# Patient Record
Sex: MALE | Race: BLACK OR AFRICAN AMERICAN | URBAN - METROPOLITAN AREA
[De-identification: names, ages, dates, MRNs, and addresses within clinical notes are randomized per-mention and may not be internally consistent; named-entity substitution may affect disease eponyms.]

---

## 2017-05-17 ENCOUNTER — HISTORICAL (OUTPATIENT)
Dept: ADMINISTRATIVE | Facility: HOSPITAL | Age: 27
End: 2017-05-17

## 2022-04-29 VITALS — HEIGHT: 72 IN | WEIGHT: 184.06 LBS | BODY MASS INDEX: 24.93 KG/M2

## 2022-05-03 NOTE — HISTORICAL [facility] CERNER
This is a historical note converted from Que. Formatting and pictures may have been removed.  Please reference [facility] for original formatting and attached multimedia. Chief Complaint  Referral for R 4th MC fx  History of Present Illness  26yM, RHD, ,?sustained a R 4th MC fracture on 5/2/17.? He presented to the ED on the DOI and was placed in an ulnar gutter splint. He presents today for his first ortho visit.  Review of Systems  Constitutional_no fever, no chills  Eye_no trauma  Respiratory_no increased work of breathing  Cardiovascular_regular rate and rhythm  Gastrointestinal_no vomiting  Genitourinary_no burning with urination  Hema/Lymph_no hemorrhage  Musculoskeletal_see note  Integumentary_no rash over extremity  Neurologic_grossly intact, see PE section  Physical Exam  Vitals & Measurements  HT:?182.88?cm? HT:?182.88?cm? WT:?83.5?kg? WT:?83.5?kg? BMI:?24.97?  Gen:? NAD, A+Ox3  Cardio:? RRR  Pulm:? No increased WOB  ?   RUE:  Skin intact  Swelling and TTP about 4th and 5th MC bases  No deformity  Motor: intact ain, pin, ulnar  SILT: m/u/r  2+ RP, bcr, wwp  No crossover  Assessment/Plan  26yM with R 4th and 5th MC fractures  - NWB LUE for 4 more weeks or until fracture healing  - ulnar gutter cast placed, pt instructed to get the cast removed in 3 weeks  - after cast removal, pt will be placed in a removable brace for 1 week for support.?  - after cast removal, begin ROM exercises.  - pt instructed to find an orthopedist in [place] for hand fracture follow up and XRs  - return appointment scheduled for mid-July as patient will not return until then.  ?   Problem List/Past Medical History  Tobacco user  Tobacco user  Historical  No historical problems  Medications  No active medications  Allergies  No Known Allergies  Social History  Alcohol - Denies Alcohol Use  Never  Substance Abuse - Denies Substance Abuse  Never  Tobacco - Denies Tobacco Use  Current some day smoker, Cigarettes, 2 per  day. Started age 17.0 Years. Ready to change: No.  Diagnostic Results  XR R hand: fracture of 4th MC shaft-base junction, 5th MC base fracture, minimally displaced, tiny fragment.      ?? Dominics medical history, physical exam findings right hand , diagnosis, and treatment outlined by the [facility]?resident has been reviewed.? Treatment plan is determined to be reasonable and appropriate.?I was present during the evaluation. X-rays right hand?reviewed. Smoking cessation is important.